# Patient Record
Sex: FEMALE | Race: WHITE | NOT HISPANIC OR LATINO | URBAN - METROPOLITAN AREA
[De-identification: names, ages, dates, MRNs, and addresses within clinical notes are randomized per-mention and may not be internally consistent; named-entity substitution may affect disease eponyms.]

---

## 2017-03-20 ENCOUNTER — APPOINTMENT (RX ONLY)
Dept: URBAN - METROPOLITAN AREA CLINIC 291 | Facility: CLINIC | Age: 57
Setting detail: DERMATOLOGY
End: 2017-03-20

## 2017-03-20 DIAGNOSIS — R20.8 OTHER DISTURBANCES OF SKIN SENSATION: ICD-10-CM

## 2017-03-20 DIAGNOSIS — L82.1 OTHER SEBORRHEIC KERATOSIS: ICD-10-CM

## 2017-03-20 PROCEDURE — 99213 OFFICE O/P EST LOW 20 MIN: CPT

## 2017-03-20 PROCEDURE — ? COUNSELING

## 2017-03-20 ASSESSMENT — LOCATION SIMPLE DESCRIPTION DERM
LOCATION SIMPLE: LEFT UPPER BACK
LOCATION SIMPLE: RIGHT HEEL

## 2017-03-20 ASSESSMENT — LOCATION ZONE DERM
LOCATION ZONE: TRUNK
LOCATION ZONE: FEET

## 2017-03-20 ASSESSMENT — LOCATION DETAILED DESCRIPTION DERM
LOCATION DETAILED: RIGHT HEEL
LOCATION DETAILED: LEFT SUPERIOR UPPER BACK

## 2017-03-20 NOTE — HPI: SKIN LESION
How Severe Is Your Skin Lesion?: moderate
Is This A New Presentation, Or A Follow-Up?: Skin Lesion
Has Your Skin Lesion Been Treated?: not been treated

## 2018-05-31 ENCOUNTER — APPOINTMENT (RX ONLY)
Dept: URBAN - METROPOLITAN AREA CLINIC 291 | Facility: CLINIC | Age: 58
Setting detail: DERMATOLOGY
End: 2018-05-31

## 2018-05-31 DIAGNOSIS — I83.9 ASYMPTOMATIC VARICOSE VEINS OF LOWER EXTREMITIES: ICD-10-CM

## 2018-05-31 PROBLEM — I83.93 ASYMPTOMATIC VARICOSE VEINS OF BILATERAL LOWER EXTREMITIES: Status: ACTIVE | Noted: 2018-05-31

## 2018-05-31 PROCEDURE — ? COUNSELING

## 2018-05-31 PROCEDURE — ? SCLEROTHERAPY (COSMETIC)

## 2018-05-31 ASSESSMENT — LOCATION SIMPLE DESCRIPTION DERM
LOCATION SIMPLE: LEFT THIGH
LOCATION SIMPLE: RIGHT PRETIBIAL REGION
LOCATION SIMPLE: RIGHT THIGH
LOCATION SIMPLE: LEFT PRETIBIAL REGION

## 2018-05-31 ASSESSMENT — LOCATION DETAILED DESCRIPTION DERM
LOCATION DETAILED: LEFT ANTERIOR DISTAL THIGH
LOCATION DETAILED: RIGHT PROXIMAL PRETIBIAL REGION
LOCATION DETAILED: RIGHT ANTERIOR DISTAL THIGH
LOCATION DETAILED: LEFT PROXIMAL PRETIBIAL REGION

## 2018-05-31 ASSESSMENT — LOCATION ZONE DERM: LOCATION ZONE: LEG

## 2018-05-31 NOTE — PROCEDURE: SCLEROTHERAPY (COSMETIC)
Sclerosant Volume (Cc): 10
Detail Level: Simple
Post-Care Instructions: Compression for 3 weeks is critical to optimize your recovery and results. Compliance with compression helps to prevent blood clots and minimizes pain, swelling, bruising, skin discoloration (staining) and the recurrence of vessels.       \nMaterials to gather for your wound care (all available over the counter)      \nCompression stockings x 2 pairs, 4 x 4 gauze, Comprilan wrap: 8 cm and 10 cm width wrap, Medical adhesive tape       \nCompression and Wound care;  Leg compression for 3 weeks is price to your success and safety. Compression at night is only needed the first day. After that, compression is needed only during waking hours.  However, if your leg feels better with compression at night, then you may continue compression at night as tolerated.       \nAfter the sclerotherapy procedure, 2 layers compression will be placed.       \n1. On the skin, folded or flat gauze will cover the treated areas.       \n2. A compression wrap (Comprilan) will be wrapped around your leg over the gauze. Once the compression wrap is in place, a compression stocking will be worn. This two layer  compression (wrap plus stocking) should be worn for the first 24 hours if tolerated.       \n3. After 24 hours, remove all compressions and dressings and just wear the compression stockings only during waking hours.        \nYou will need to wear compression stockings for three weeks after your procedure, unless your physician instructs you otherwise.       \nActivity       \n - It is important NOT to be sitting or lying down for several hours after surgery. You may begin walking immediately after surgery. This is good for you, but take it easy.       \n - For 2 days after treatment: Avoid aerobic exercise, weight training, and all other types of exertion that increase your breathing and pulse rates.       \n - Do not get a tan for one month after sclerotherapy. Tanning increases your risk of skin discoloration.      \nBathing       \nAfter 24 hours, you may shower or bathe in tepid water, but keep the compression stocking on. Avoid immersion in hot tubs.      \nDiscomfort . For pain or discomfort:       \n - You may take acetaminophen (TylenolTM, Extra-Strength TylenolTM or DatrilTM) as directed.       \n - Do not use aspirin, products containing aspirin, or ibuprofen (for example AdvilTM or MotrinTM) for five days after your surgery, unless approved by your physician.       \n - Dietary restrictions Do not drink alcoholic beverages for two days after your sclerotherapy procedure.       \nPossible side effects following sclerotherapy  After sclerotherapy, mild swelling is expected. The injection sites may also become bruised or gray. You may also experience one or more of the following side effects, which almost always go away within one to four months:       \n - Darkening of the injected veins       \n - Brownish staining of the skin       \n - Small clotted vessels under the surface of the skin that you can feel      \n - Bruising of the injection sites       \nWhat to do about bruising  This will resolve within 2-3 weeks. If you wish the bruising to disappear sooner, then applying Arnicare cream (over the counter, health food stores) will help.       \nWhat to do if you feel small clotted vessels under the surface of the skin.      \n - Call us for a follow up appointment. These small clots can be drained through a small nick.       \n - Draining these small clots will help you heal faster and with less discoloration.      \nWhen to contact your physician       \nContact your physician if you experience any of the following:       \n - Treated areas become increasingly sore, tender, red or warm       \n - Acetaminophen does not relieve your discomfort       \n - Injection sites turn black or the skin around the site breaks down       \n - Ulceration of the injection sites       \n - You develop blisters from the tape       \n - You develop significant swelling or pain in the leg       \n - Darkening of large areas of the skin or foot
Consent was obtained with risks, benefits, and alternatives discussed for the above procedures.  Photographs were taken.
Disposition: Compression stockings and short stretch elastic bandages were placed postoperatively.

## 2019-12-13 ENCOUNTER — APPOINTMENT (RX ONLY)
Dept: URBAN - METROPOLITAN AREA CLINIC 291 | Facility: CLINIC | Age: 59
Setting detail: DERMATOLOGY
End: 2019-12-13

## 2019-12-13 DIAGNOSIS — L82.0 INFLAMED SEBORRHEIC KERATOSIS: ICD-10-CM

## 2019-12-13 DIAGNOSIS — D17 BENIGN LIPOMATOUS NEOPLASM: ICD-10-CM

## 2019-12-13 PROBLEM — D17.21 BENIGN LIPOMATOUS NEOPLASM OF SKIN AND SUBCUTANEOUS TISSUE OF RIGHT ARM: Status: ACTIVE | Noted: 2019-12-13

## 2019-12-13 PROCEDURE — 99213 OFFICE O/P EST LOW 20 MIN: CPT | Mod: 25

## 2019-12-13 PROCEDURE — 17110 DESTRUCTION B9 LES UP TO 14: CPT

## 2019-12-13 PROCEDURE — ? COUNSELING

## 2019-12-13 PROCEDURE — ? LIQUID NITROGEN

## 2019-12-13 ASSESSMENT — LOCATION SIMPLE DESCRIPTION DERM
LOCATION SIMPLE: RIGHT UPPER BACK
LOCATION SIMPLE: RIGHT SHOULDER

## 2019-12-13 ASSESSMENT — LOCATION ZONE DERM
LOCATION ZONE: ARM
LOCATION ZONE: TRUNK

## 2019-12-13 ASSESSMENT — LOCATION DETAILED DESCRIPTION DERM
LOCATION DETAILED: RIGHT SUPERIOR MEDIAL UPPER BACK
LOCATION DETAILED: RIGHT POSTERIOR SHOULDER

## 2021-04-29 ENCOUNTER — APPOINTMENT (RX ONLY)
Dept: URBAN - METROPOLITAN AREA CLINIC 307 | Facility: CLINIC | Age: 61
Setting detail: DERMATOLOGY
End: 2021-04-29

## 2021-04-29 DIAGNOSIS — L70.0 ACNE VULGARIS: ICD-10-CM | Status: INADEQUATELY CONTROLLED

## 2021-04-29 DIAGNOSIS — D22 MELANOCYTIC NEVI: ICD-10-CM | Status: STABLE

## 2021-04-29 DIAGNOSIS — I83.9 ASYMPTOMATIC VARICOSE VEINS OF LOWER EXTREMITIES: ICD-10-CM | Status: WORSENING

## 2021-04-29 DIAGNOSIS — L81.4 OTHER MELANIN HYPERPIGMENTATION: ICD-10-CM | Status: STABLE

## 2021-04-29 PROBLEM — D22.5 MELANOCYTIC NEVI OF TRUNK: Status: ACTIVE | Noted: 2021-04-29

## 2021-04-29 PROBLEM — I83.93 ASYMPTOMATIC VARICOSE VEINS OF BILATERAL LOWER EXTREMITIES: Status: ACTIVE | Noted: 2021-04-29

## 2021-04-29 PROCEDURE — ? PRESCRIPTION

## 2021-04-29 PROCEDURE — ? COUNSELING

## 2021-04-29 PROCEDURE — 99214 OFFICE O/P EST MOD 30 MIN: CPT

## 2021-04-29 PROCEDURE — ? SCLEROTHERAPY (COSMETIC)

## 2021-04-29 PROCEDURE — ? FULL BODY SKIN EXAM

## 2021-04-29 RX ORDER — TRETIONIN 0.5 MG/G
CREAM TOPICAL
Qty: 1 | Refills: 3 | Status: ERX | COMMUNITY
Start: 2021-04-29

## 2021-04-29 RX ADMIN — TRETIONIN: 0.5 CREAM TOPICAL at 00:00

## 2021-04-29 ASSESSMENT — LOCATION ZONE DERM
LOCATION ZONE: LEG
LOCATION ZONE: TRUNK
LOCATION ZONE: FACE

## 2021-04-29 ASSESSMENT — LOCATION DETAILED DESCRIPTION DERM
LOCATION DETAILED: RIGHT DISTAL POSTERIOR THIGH
LOCATION DETAILED: LEFT ANTERIOR DISTAL THIGH
LOCATION DETAILED: LEFT DISTAL POSTERIOR THIGH
LOCATION DETAILED: RIGHT INFERIOR CENTRAL MALAR CHEEK
LOCATION DETAILED: LEFT PROXIMAL PRETIBIAL REGION
LOCATION DETAILED: RIGHT ANTERIOR DISTAL THIGH
LOCATION DETAILED: LEFT INFERIOR CENTRAL MALAR CHEEK
LOCATION DETAILED: RIGHT PROXIMAL PRETIBIAL REGION
LOCATION DETAILED: LEFT MEDIAL UPPER BACK

## 2021-04-29 ASSESSMENT — LOCATION SIMPLE DESCRIPTION DERM
LOCATION SIMPLE: LEFT PRETIBIAL REGION
LOCATION SIMPLE: RIGHT THIGH
LOCATION SIMPLE: RIGHT POSTERIOR THIGH
LOCATION SIMPLE: RIGHT PRETIBIAL REGION
LOCATION SIMPLE: LEFT CHEEK
LOCATION SIMPLE: LEFT POSTERIOR THIGH
LOCATION SIMPLE: RIGHT CHEEK
LOCATION SIMPLE: LEFT THIGH
LOCATION SIMPLE: LEFT UPPER BACK

## 2021-04-29 NOTE — PROCEDURE: SCLEROTHERAPY (COSMETIC)
Price (Use Numbers Only, No Special Characters Or $): 272 Price (Use Numbers Only, No Special Characters Or $): 293

## 2021-04-29 NOTE — PROCEDURE: SCLEROTHERAPY (COSMETIC)
Post-Care Instructions: Compression for 3 weeks is critical to optimize your recovery and results. Compliance with compression helps to prevent blood clots and minimizes pain, swelling, bruising, skin discoloration (staining) and the recurrence of vessels.       \nMaterials to gather for your wound care (all available over the counter)      \nCompression stockings x 2 pairs, 4 x 4 gauze, Comprilan wrap: 8 cm and 10 cm width wrap, Medical adhesive tape       \nCompression and Wound care;  Leg compression for 3 weeks is price to your success and safety. Compression at night is only needed the first day. After that, compression is needed only during waking hours.  However, if your leg feels better with compression at night, then you may continue compression at night as tolerated.       \nAfter the sclerotherapy procedure, 2 layers compression will be placed.       \n1. On the skin, folded or flat gauze will cover the treated areas.       \n2. A compression wrap (Comprilan) will be wrapped around your leg over the gauze. Once the compression wrap is in place, a compression stocking will be worn. This two layer  compression (wrap plus stocking) should be worn for the first 24 hours if tolerated.       \n3. After 24 hours, remove all compressions and dressings and just wear the compression stockings only during waking hours.        \nYou will need to wear compression stockings for three weeks after your procedure, unless your physician instructs you otherwise.       \nActivity       \n - It is important NOT to be sitting or lying down for several hours after surgery. You may begin walking immediately after surgery. This is good for you, but take it easy.       \n - For 2 days after treatment: Avoid aerobic exercise, weight training, and all other types of exertion that increase your breathing and pulse rates.       \n - Do not get a tan for one month after sclerotherapy. Tanning increases your risk of skin discoloration.      \nBathing       \nAfter 24 hours, you may shower or bathe in tepid water, but keep the compression stocking on. Avoid immersion in hot tubs.      \nDiscomfort . For pain or discomfort:       \n - You may take acetaminophen (TylenolTM, Extra-Strength TylenolTM or DatrilTM) as directed.       \n - Do not use aspirin, products containing aspirin, or ibuprofen (for example AdvilTM or MotrinTM) for five days after your surgery, unless approved by your physician.       \n - Dietary restrictions Do not drink alcoholic beverages for two days after your sclerotherapy procedure.       \nPossible side effects following sclerotherapy  After sclerotherapy, mild swelling is expected. The injection sites may also become bruised or gray. You may also experience one or more of the following side effects, which almost always go away within one to four months:       \n - Darkening of the injected veins       \n - Brownish staining of the skin       \n - Small clotted vessels under the surface of the skin that you can feel      \n - Bruising of the injection sites       \nWhat to do about bruising  This will resolve within 2-3 weeks. If you wish the bruising to disappear sooner, then applying Arnicare cream (over the counter, health food stores) will help.       \nWhat to do if you feel small clotted vessels under the surface of the skin.      \n - Call us for a follow up appointment. These small clots can be drained through a small nick.       \n - Draining these small clots will help you heal faster and with less discoloration.      \nWhen to contact your physician       \nContact your physician if you experience any of the following:       \n - Treated areas become increasingly sore, tender, red or warm       \n - Acetaminophen does not relieve your discomfort       \n - Injection sites turn black or the skin around the site breaks down       \n - Ulceration of the injection sites       \n - You develop blisters from the tape       \n - You develop significant swelling or pain in the leg       \n - Darkening of large areas of the skin or foot

## 2021-04-29 NOTE — PROCEDURE: COUNSELING
Detail Level: Generalized
Detail Level: Zone
Bactrim Pregnancy And Lactation Text: This medication is Pregnancy Category D and is known to cause fetal risk.  It is also excreted in breast milk.
Use Enhanced Medication Counseling?: No
Topical Retinoid counseling:  Patient advised to apply a pea-sized amount only at bedtime and wait 30 minutes after washing their face before applying.  If too drying, patient may add a non-comedogenic moisturizer. The patient verbalized understanding of the proper use and possible adverse effects of retinoids.  All of the patient's questions and concerns were addressed.
Isotretinoin Counseling: Patient should get monthly blood tests, not donate blood, not drive at night if vision affected, not share medication, and not undergo elective surgery for 6 months after tx completed. Side effects reviewed, pt to contact office should one occur.
Doxycycline Counseling:  Patient counseled regarding possible photosensitivity and increased risk for sunburn.  Patient instructed to avoid sunlight, if possible.  When exposed to sunlight, patients should wear protective clothing, sunglasses, and sunscreen.  The patient was instructed to call the office immediately if the following severe adverse effects occur:  hearing changes, easy bruising/bleeding, severe headache, or vision changes.  The patient verbalized understanding of the proper use and possible adverse effects of doxycycline.  All of the patient's questions and concerns were addressed.
Minocycline Pregnancy And Lactation Text: This medication is Pregnancy Category D and not consider safe during pregnancy. It is also excreted in breast milk.
Spironolactone Pregnancy And Lactation Text: This medication can cause feminization of the male fetus and should be avoided during pregnancy. The active metabolite is also found in breast milk.
Dapsone Pregnancy And Lactation Text: This medication is Pregnancy Category C and is not considered safe during pregnancy or breast feeding.
Topical Clindamycin Counseling: Patient counseled that this medication may cause skin irritation or allergic reactions.  In the event of skin irritation, the patient was advised to reduce the amount of the drug applied or use it less frequently.   The patient verbalized understanding of the proper use and possible adverse effects of clindamycin.  All of the patient's questions and concerns were addressed.
Doxycycline Pregnancy And Lactation Text: This medication is Pregnancy Category D and not consider safe during pregnancy. It is also excreted in breast milk but is considered safe for shorter treatment courses.
Topical Sulfur Applications Counseling: Topical Sulfur Counseling: Patient counseled that this medication may cause skin irritation or allergic reactions.  In the event of skin irritation, the patient was advised to reduce the amount of the drug applied or use it less frequently.   The patient verbalized understanding of the proper use and possible adverse effects of topical sulfur application.  All of the patient's questions and concerns were addressed.
Sarecycline Counseling: Patient advised regarding possible photosensitivity and discoloration of the teeth, skin, lips, tongue and gums.  Patient instructed to avoid sunlight, if possible.  When exposed to sunlight, patients should wear protective clothing, sunglasses, and sunscreen.  The patient was instructed to call the office immediately if the following severe adverse effects occur:  hearing changes, easy bruising/bleeding, severe headache, or vision changes.  The patient verbalized understanding of the proper use and possible adverse effects of sarecycline.  All of the patient's questions and concerns were addressed.
Birth Control Pills Counseling: Birth Control Pill Counseling: I discussed with the patient the potential side effects of OCPs including but not limited to increased risk of stroke, heart attack, thrombophlebitis, deep venous thrombosis, hepatic adenomas, breast changes, GI upset, headaches, and depression.  The patient verbalized understanding of the proper use and possible adverse effects of OCPs. All of the patient's questions and concerns were addressed.
Azithromycin Counseling:  I discussed with the patient the risks of azithromycin including but not limited to GI upset, allergic reaction, drug rash, diarrhea, and yeast infections.
Topical Retinoid Pregnancy And Lactation Text: This medication is Pregnancy Category C. It is unknown if this medication is excreted in breast milk.
Topical Clindamycin Pregnancy And Lactation Text: This medication is Pregnancy Category B and is considered safe during pregnancy. It is unknown if it is excreted in breast milk.
Isotretinoin Pregnancy And Lactation Text: This medication is Pregnancy Category X and is considered extremely dangerous during pregnancy. It is unknown if it is excreted in breast milk.
Tetracycline Counseling: Patient counseled regarding possible photosensitivity and increased risk for sunburn.  Patient instructed to avoid sunlight, if possible.  When exposed to sunlight, patients should wear protective clothing, sunglasses, and sunscreen.  The patient was instructed to call the office immediately if the following severe adverse effects occur:  hearing changes, easy bruising/bleeding, severe headache, or vision changes.  The patient verbalized understanding of the proper use and possible adverse effects of tetracycline.  All of the patient's questions and concerns were addressed. Patient understands to avoid pregnancy while on therapy due to potential birth defects.
Azithromycin Pregnancy And Lactation Text: This medication is considered safe during pregnancy and is also secreted in breast milk.
Benzoyl Peroxide Counseling: Patient counseled that medicine may cause skin irritation and bleach clothing.  In the event of skin irritation, the patient was advised to reduce the amount of the drug applied or use it less frequently.   The patient verbalized understanding of the proper use and possible adverse effects of benzoyl peroxide.  All of the patient's questions and concerns were addressed.
Tazorac Counseling:  Patient advised that medication is irritating and drying.  Patient may need to apply sparingly and wash off after an hour before eventually leaving it on overnight.  The patient verbalized understanding of the proper use and possible adverse effects of tazorac.  All of the patient's questions and concerns were addressed.
Erythromycin Counseling:  I discussed with the patient the risks of erythromycin including but not limited to GI upset, allergic reaction, drug rash, diarrhea, increase in liver enzymes, and yeast infections.
Topical Sulfur Applications Pregnancy And Lactation Text: This medication is Pregnancy Category C and has an unknown safety profile during pregnancy. It is unknown if this topical medication is excreted in breast milk.
High Dose Vitamin A Counseling: Side effects reviewed, pt to contact office should one occur.
Birth Control Pills Pregnancy And Lactation Text: This medication should be avoided if pregnant and for the first 30 days post-partum.
Erythromycin Pregnancy And Lactation Text: This medication is Pregnancy Category B and is considered safe during pregnancy. It is also excreted in breast milk.
Minocycline Counseling: Patient advised regarding possible photosensitivity and discoloration of the teeth, skin, lips, tongue and gums.  Patient instructed to avoid sunlight, if possible.  When exposed to sunlight, patients should wear protective clothing, sunglasses, and sunscreen.  The patient was instructed to call the office immediately if the following severe adverse effects occur:  hearing changes, easy bruising/bleeding, severe headache, or vision changes.  The patient verbalized understanding of the proper use and possible adverse effects of minocycline.  All of the patient's questions and concerns were addressed.
Spironolactone Counseling: Patient advised regarding risks of diarrhea, abdominal pain, hyperkalemia, birth defects (for female patients), liver toxicity and renal toxicity. The patient may need blood work to monitor liver and kidney function and potassium levels while on therapy. The patient verbalized understanding of the proper use and possible adverse effects of spironolactone.  All of the patient's questions and concerns were addressed.
Bactrim Counseling:  I discussed with the patient the risks of sulfa antibiotics including but not limited to GI upset, allergic reaction, drug rash, diarrhea, dizziness, photosensitivity, and yeast infections.  Rarely, more serious reactions can occur including but not limited to aplastic anemia, agranulocytosis, methemoglobinemia, blood dyscrasias, liver or kidney failure, lung infiltrates or desquamative/blistering drug rashes.
Benzoyl Peroxide Pregnancy And Lactation Text: This medication is Pregnancy Category C. It is unknown if benzoyl peroxide is excreted in breast milk.
Dapsone Counseling: I discussed with the patient the risks of dapsone including but not limited to hemolytic anemia, agranulocytosis, rashes, methemoglobinemia, kidney failure, peripheral neuropathy, headaches, GI upset, and liver toxicity.  Patients who start dapsone require monitoring including baseline LFTs and weekly CBCs for the first month, then every month thereafter.  The patient verbalized understanding of the proper use and possible adverse effects of dapsone.  All of the patient's questions and concerns were addressed.
Tazorac Pregnancy And Lactation Text: This medication is not safe during pregnancy. It is unknown if this medication is excreted in breast milk.
High Dose Vitamin A Pregnancy And Lactation Text: High dose vitamin A therapy is contraindicated during pregnancy and breast feeding.

## 2023-10-12 ENCOUNTER — APPOINTMENT (RX ONLY)
Dept: URBAN - METROPOLITAN AREA CLINIC 307 | Facility: CLINIC | Age: 63
Setting detail: DERMATOLOGY
End: 2023-10-12

## 2023-10-12 DIAGNOSIS — K13.0 DISEASES OF LIPS: ICD-10-CM | Status: INADEQUATELY CONTROLLED

## 2023-10-12 DIAGNOSIS — D22 MELANOCYTIC NEVI: ICD-10-CM

## 2023-10-12 DIAGNOSIS — L82.1 OTHER SEBORRHEIC KERATOSIS: ICD-10-CM

## 2023-10-12 DIAGNOSIS — D18.0 HEMANGIOMA: ICD-10-CM

## 2023-10-12 DIAGNOSIS — L81.4 OTHER MELANIN HYPERPIGMENTATION: ICD-10-CM

## 2023-10-12 DIAGNOSIS — L57.0 ACTINIC KERATOSIS: ICD-10-CM | Status: INADEQUATELY CONTROLLED

## 2023-10-12 PROBLEM — D18.01 HEMANGIOMA OF SKIN AND SUBCUTANEOUS TISSUE: Status: ACTIVE | Noted: 2023-10-12

## 2023-10-12 PROBLEM — D22.5 MELANOCYTIC NEVI OF TRUNK: Status: ACTIVE | Noted: 2023-10-12

## 2023-10-12 PROBLEM — D22.72 MELANOCYTIC NEVI OF LEFT LOWER LIMB, INCLUDING HIP: Status: ACTIVE | Noted: 2023-10-12

## 2023-10-12 PROCEDURE — ? TREATMENT REGIMEN

## 2023-10-12 PROCEDURE — ? COUNSELING

## 2023-10-12 PROCEDURE — ? FULL BODY SKIN EXAM

## 2023-10-12 PROCEDURE — 99213 OFFICE O/P EST LOW 20 MIN: CPT | Mod: 25

## 2023-10-12 PROCEDURE — 17003 DESTRUCT PREMALG LES 2-14: CPT

## 2023-10-12 PROCEDURE — 17000 DESTRUCT PREMALG LESION: CPT

## 2023-10-12 PROCEDURE — ? LIQUID NITROGEN

## 2023-10-12 ASSESSMENT — LOCATION ZONE DERM
LOCATION ZONE: LEG
LOCATION ZONE: TRUNK
LOCATION ZONE: LIP
LOCATION ZONE: NOSE
LOCATION ZONE: FACE
LOCATION ZONE: ARM

## 2023-10-12 ASSESSMENT — LOCATION DETAILED DESCRIPTION DERM
LOCATION DETAILED: NASAL DORSUM
LOCATION DETAILED: LEFT MEDIAL UPPER BACK
LOCATION DETAILED: INFERIOR THORACIC SPINE
LOCATION DETAILED: LEFT ANTERIOR SHOULDER
LOCATION DETAILED: RIGHT SUPERIOR MEDIAL UPPER BACK
LOCATION DETAILED: LEFT MEDIAL BREAST 7-8:00 REGION
LOCATION DETAILED: RIGHT MEDIAL UPPER BACK
LOCATION DETAILED: LEFT ANTERIOR PROXIMAL THIGH
LOCATION DETAILED: RIGHT ANTERIOR SHOULDER
LOCATION DETAILED: RIGHT SUPERIOR VERMILION LIP
LOCATION DETAILED: RIGHT CENTRAL MALAR CHEEK

## 2023-10-12 ASSESSMENT — LOCATION SIMPLE DESCRIPTION DERM
LOCATION SIMPLE: RIGHT CHEEK
LOCATION SIMPLE: LEFT SHOULDER
LOCATION SIMPLE: RIGHT SHOULDER
LOCATION SIMPLE: UPPER BACK
LOCATION SIMPLE: LEFT THIGH
LOCATION SIMPLE: NOSE
LOCATION SIMPLE: LEFT UPPER BACK
LOCATION SIMPLE: RIGHT LIP
LOCATION SIMPLE: LEFT BREAST
LOCATION SIMPLE: RIGHT UPPER BACK

## 2023-10-12 NOTE — PROCEDURE: COUNSELING
Detail Level: Generalized
Detail Level: Detailed
Patient Specific Counseling (Will Not Stick From Patient To Patient): Advised that various lip balms contain allergens; recc plain vaseline to avoid contact with allergens/monitor foods that may trigger reactions